# Patient Record
Sex: FEMALE | Race: WHITE | NOT HISPANIC OR LATINO | Employment: OTHER | URBAN - METROPOLITAN AREA
[De-identification: names, ages, dates, MRNs, and addresses within clinical notes are randomized per-mention and may not be internally consistent; named-entity substitution may affect disease eponyms.]

---

## 2024-01-08 ENCOUNTER — HOSPITAL ENCOUNTER (EMERGENCY)
Facility: HOSPITAL | Age: 69
Discharge: HOME | End: 2024-01-08
Attending: STUDENT IN AN ORGANIZED HEALTH CARE EDUCATION/TRAINING PROGRAM
Payer: MEDICARE

## 2024-01-08 VITALS
HEART RATE: 69 BPM | WEIGHT: 160 LBS | RESPIRATION RATE: 16 BRPM | OXYGEN SATURATION: 98 % | SYSTOLIC BLOOD PRESSURE: 189 MMHG | TEMPERATURE: 97 F | BODY MASS INDEX: 28.35 KG/M2 | HEIGHT: 63 IN | DIASTOLIC BLOOD PRESSURE: 86 MMHG

## 2024-01-08 DIAGNOSIS — M54.32 SCIATICA OF LEFT SIDE: Primary | ICD-10-CM

## 2024-01-08 PROCEDURE — 99284 EMERGENCY DEPT VISIT MOD MDM: CPT | Performed by: STUDENT IN AN ORGANIZED HEALTH CARE EDUCATION/TRAINING PROGRAM

## 2024-01-08 PROCEDURE — 99283 EMERGENCY DEPT VISIT LOW MDM: CPT

## 2024-01-08 PROCEDURE — 2500000004 HC RX 250 GENERAL PHARMACY W/ HCPCS (ALT 636 FOR OP/ED): Performed by: STUDENT IN AN ORGANIZED HEALTH CARE EDUCATION/TRAINING PROGRAM

## 2024-01-08 PROCEDURE — 96372 THER/PROPH/DIAG INJ SC/IM: CPT

## 2024-01-08 PROCEDURE — 96372 THER/PROPH/DIAG INJ SC/IM: CPT | Performed by: STUDENT IN AN ORGANIZED HEALTH CARE EDUCATION/TRAINING PROGRAM

## 2024-01-08 RX ORDER — ACETAMINOPHEN 325 MG/1
650 TABLET ORAL EVERY 6 HOURS PRN
Qty: 30 TABLET | Refills: 0 | Status: SHIPPED | OUTPATIENT
Start: 2024-01-08

## 2024-01-08 RX ORDER — PREDNISONE 10 MG/1
10 TABLET ORAL DAILY
COMMUNITY

## 2024-01-08 RX ORDER — NAPROXEN 500 MG/1
500 TABLET ORAL
Qty: 30 TABLET | Refills: 0 | Status: SHIPPED | OUTPATIENT
Start: 2024-01-08 | End: 2024-01-23

## 2024-01-08 RX ORDER — ORPHENADRINE CITRATE 30 MG/ML
60 INJECTION INTRAMUSCULAR; INTRAVENOUS ONCE
Status: COMPLETED | OUTPATIENT
Start: 2024-01-08 | End: 2024-01-08

## 2024-01-08 RX ORDER — METHOCARBAMOL 500 MG/1
500 TABLET, FILM COATED ORAL 3 TIMES DAILY
Qty: 21 TABLET | Refills: 0 | Status: SHIPPED | OUTPATIENT
Start: 2024-01-08 | End: 2024-01-15

## 2024-01-08 RX ORDER — KETOROLAC TROMETHAMINE 30 MG/ML
30 INJECTION, SOLUTION INTRAMUSCULAR; INTRAVENOUS ONCE
Status: COMPLETED | OUTPATIENT
Start: 2024-01-08 | End: 2024-01-08

## 2024-01-08 RX ADMIN — KETOROLAC TROMETHAMINE 30 MG: 30 INJECTION, SOLUTION INTRAMUSCULAR; INTRAVENOUS at 21:54

## 2024-01-08 RX ADMIN — ORPHENADRINE CITRATE 60 MG: 60 INJECTION INTRAMUSCULAR; INTRAVENOUS at 21:54

## 2024-01-08 ASSESSMENT — COLUMBIA-SUICIDE SEVERITY RATING SCALE - C-SSRS
2. HAVE YOU ACTUALLY HAD ANY THOUGHTS OF KILLING YOURSELF?: NO
1. IN THE PAST MONTH, HAVE YOU WISHED YOU WERE DEAD OR WISHED YOU COULD GO TO SLEEP AND NOT WAKE UP?: NO
6. HAVE YOU EVER DONE ANYTHING, STARTED TO DO ANYTHING, OR PREPARED TO DO ANYTHING TO END YOUR LIFE?: NO

## 2024-01-09 NOTE — ED PROVIDER NOTES
HPI   Chief Complaint   Patient presents with    Leg Pain     Pt arrived to the ED with c/o leg pain. Pt states she had some hx of leg pain a couple weeks ago and is taking prednisone and a muscle relaxer. Pt states she felt good to go workout today but felt her leg tweak and she has pain from the hip down to her knee. Pt was dx with sciatica a couple weeks ago. Pt is alert and oriented x4.        HPI     Patient is a 68-year-old female present emergency department with left leg discomfort.  Of note she has a known history of sciatica which she states she had a flareup in the setting of lifting heavy things for which she then had a massage with some improvement and talk to her primary care doctor and started her on a course of steroids which did help.  Today she went to a dance class and in that she was doing a squat and when she went to squat she had an exacerbation of her discomfort.  Shooting left leg pain.  No change in bowel or bladder habits no bladder or bowel incontinence.  She has had some numbness in the lateral aspect of the leg with the symptoms which is not new for her.  No chest pain, shortness of breath, headache, fevers or chills or acute changes elsewise.               [place] Coma Scale Score: 15                  Patient History   History reviewed. No pertinent past medical history.  History reviewed. No pertinent surgical history.  No family history on file.  Social History     Tobacco Use    Smoking status: Never    Smokeless tobacco: Never   Substance Use Topics    Alcohol use: Yes     Comment: social    Drug use: Never       Physical Exam   ED Triage Vitals   Temp Heart Rate Resp BP   01/08/24 2008 01/08/24 2008 01/08/24 2008 01/08/24 2009   36.1 °C (97 °F) 69 16 (!) 189/86      SpO2 Temp Source Heart Rate Source Patient Position   01/08/24 2008 01/08/24 2008 -- --   98 % Skin        BP Location FiO2 (%)     -- --             Physical Exam  Vitals and nursing note reviewed.   Constitutional:        General: She is not in acute distress.     Appearance: She is well-developed.   HENT:      Head: Normocephalic and atraumatic.   Eyes:      Conjunctiva/sclera: Conjunctivae normal.   Cardiovascular:      Rate and Rhythm: Normal rate and regular rhythm.      Heart sounds: No murmur heard.  Pulmonary:      Effort: Pulmonary effort is normal. No respiratory distress.      Breath sounds: Normal breath sounds.   Abdominal:      Palpations: Abdomen is soft.      Tenderness: There is no abdominal tenderness.   Musculoskeletal:         General: No swelling.      Cervical back: Neck supple.   Skin:     General: Skin is warm and dry.      Capillary Refill: Capillary refill takes less than 2 seconds.   Neurological:      General: No focal deficit present.      Mental Status: She is alert and oriented to person, place, and time.      Cranial Nerves: No cranial nerve deficit.      Motor: No weakness.      Coordination: Coordination normal.      Gait: Gait normal.   Psychiatric:         Mood and Affect: Mood normal.         ED Course & MDM   Diagnoses as of 01/09/24 0028   Sciatica of left side       Medical Decision Making  Patient is a 68-year-old female presents the emergency department as above.  Arrival hemodynamically she is hypertensive otherwise stable.  She has reproducible left lumbosacral tenderness.  She has good 5-5 strength on knee flexion extension, dorsi plantarflexion and hip flexion while sleeping seated.  She has worsening of pain with straight leg raise.  Consistent exam with left-sided sciatica.  Diuresed here with dose here with Toradol and Norflex and discussed multimodal pain regimen for home, follow-up outpatient and return precautions.  No red flag signs or symptoms at this time and appropriate for home-going.  Will be discharged home.    Procedure  Procedures     Leelee Mckeon MD  01/09/24 0029

## 2024-01-09 NOTE — ED TRIAGE NOTES
Pt arrived to the ED with c/o leg pain. Pt states she had some hx of leg pain a couple weeks ago and is taking prednisone and a muscle relaxer. Pt states she felt good to go workout today but felt her leg tweak and she has pain from the hip down to her knee. Pt was dx with sciatica a couple weeks ago. Pt is alert and oriented x4.